# Patient Record
Sex: FEMALE | Race: BLACK OR AFRICAN AMERICAN | NOT HISPANIC OR LATINO | URBAN - METROPOLITAN AREA
[De-identification: names, ages, dates, MRNs, and addresses within clinical notes are randomized per-mention and may not be internally consistent; named-entity substitution may affect disease eponyms.]

---

## 2017-03-08 VITALS
SYSTOLIC BLOOD PRESSURE: 185 MMHG | HEART RATE: 59 BPM | TEMPERATURE: 98 F | OXYGEN SATURATION: 99 % | WEIGHT: 293 LBS | DIASTOLIC BLOOD PRESSURE: 69 MMHG | HEIGHT: 61 IN | RESPIRATION RATE: 16 BRPM

## 2017-03-08 NOTE — H&P ADULT - ASSESSMENT
76 y.o Female with PMHx of HTN, hyperlipidemia, DM II,  Known LBBB, Prior hx of Vaginal Bleed ~ one year ago while on DAPT subsequently  taken off of Plavix and continued on ASA, Known CAD with hx of multiple prior PCIs, most recently @ [place] in 03/2007 (3/14/2007 PCI to distal LAD per stent card),  who presented to her cardiologist c/o CP and exertional SOB over the past 6 months. Pt reports getting SOB upon ambulation of less than one city block or when climbing less than one flight of stairs, relieved with rest.   She also reports non radiating sub sternal chest "tightness" occurring mainly while at rest. Nuclear Stress test done on 01/30/17 revealed small reversible perfusion abnormality involving the mid inferior wall, LVEF of 54%.    Of note, pt reports fungal rash on her L side for the past week. Denies fever, chills.   In light of pt’s risk factors, Known CAD, and above presenting CCS Anginal Class 4 Symptoms, and abnormal stress test, pt is now referred to [facility] for recommended Cardiac Cath with possible intervention if clinically indicated to r/o suspected progression of CAD.    LBBB @ 56 BPM    BP in holding area 160's/110's. Dr. Aguilar is aware and will give medication in the room if necessary.     As discussed with Dr. Aguilar, ASA 325mg PO given prior to cardiac cath. No Plavix prior to cath.     ASA 3, Mallampati 3 76 y.o Female with PMHx of HTN, hyperlipidemia, DM II,  Known LBBB, Prior hx of Vaginal Bleed ~ one year ago while on DAPT subsequently  taken off of Plavix and continued on ASA, Known CAD with hx of multiple prior PCIs, most recently @ [place] in 03/2007 (3/14/2007 PCI to distal LAD per stent card),  who presented to her cardiologist c/o CP and exertional SOB over the past 6 months. Pt reports getting SOB upon ambulation of less than one city block or when climbing less than one flight of stairs, relieved with rest.   She also reports non radiating sub sternal chest "tightness" occurring mainly while at rest. Nuclear Stress test done on 01/30/17 revealed small reversible perfusion abnormality involving the mid inferior wall, LVEF of 54%.    Of note, pt reports fungal rash on her L side for the past week. Denies fever, chills.   In light of pt’s risk factors, Known CAD, and above presenting CCS Anginal Class 4 Symptoms, and abnormal stress test, pt is now referred to [facility] for recommended Cardiac Cath with possible intervention if clinically indicated to r/o suspected progression of CAD.    LBBB @ 56 BPM    BP in holding area 160-180s/90s-110's. Dr. Aguilar is aware and will give medication in the room if necessary.     As discussed with Dr. Aguilar, ASA 325mg PO given prior to cardiac cath. No Plavix prior to cath.     ASA 3, Mallampati 3 76 y.o Female with PMHx of HTN, hyperlipidemia, DM II,  Known LBBB, Prior hx of Vaginal Bleed ~ one year ago while on DAPT subsequently  taken off of Plavix and continued on ASA, Known CAD with hx of multiple prior PCIs, most recently @ [place] in 03/2007 (3/14/2007 PCI to distal LAD per stent card),  who presented to her cardiologist c/o CP and exertional SOB over the past 6 months. Pt reports getting SOB upon ambulation of less than one city block or when climbing less than one flight of stairs, relieved with rest.   She also reports non radiating sub sternal chest "tightness" occurring mainly while at rest. Nuclear Stress test done on 01/30/17 revealed small reversible perfusion abnormality involving the mid inferior wall, LVEF of 54%.    Of note, pt reports fungal rash on her L side for the past week. Denies fever, chills.   In light of pt’s risk factors, Known CAD, and above presenting CCS Anginal Class 4 Symptoms, and abnormal stress test, pt is now referred to [facility] for recommended Cardiac Cath with possible intervention if clinically indicated to r/o suspected progression of CAD.    LBBB @ 56 BPM    BP in holding area 160-180s/90s-110's. Dr. Aguilar is aware and will give medication in the room if necessary.     As discussed with Dr. Aguilar, ASA 325mg PO and Plavix 600mg PO given prior to cardiac cath. H/H stable.    Potassium 6.3 prior to cath. Repeat iStat was 3.5. Elevated level most likely lab error. As per Dr. Aguilar, okay to proceed with cath.     ASA 3, Mallampati 3 76 y.o Female with PMHx of HTN, hyperlipidemia, DM II,  Known LBBB, Prior hx of Vaginal Bleed ~ one year ago while on DAPT subsequently  taken off of Plavix and continued on ASA, Known CAD with hx of multiple prior PCIs, most recently @ [place] in 03/2007 (3/14/2007 PCI to distal LAD per stent card),  who presented to her cardiologist c/o CP and exertional SOB over the past 6 months. Pt reports getting SOB upon ambulation of less than one city block or when climbing less than one flight of stairs, relieved with rest.   She also reports non radiating sub sternal chest "tightness" occurring mainly while at rest. Nuclear Stress test done on 01/30/17 revealed small reversible perfusion abnormality involving the mid inferior wall, LVEF of 54%.    Of note, pt reports fungal rash on her L side for the past week. Denies fever, chills.   In light of pt’s risk factors, Known CAD, and above presenting CCS Anginal Class 4 Symptoms, and abnormal stress test, pt is now referred to [facility] for recommended Cardiac Cath with possible intervention if clinically indicated to r/o suspected progression of CAD.    LBBB @ 56 BPM    BP in holding area 160-180s/90s-110's. Dr. Aguilar is aware and will give medication in the room if necessary.     As discussed with Dr. Aguilar, ASA 325mg PO and Plavix 600mg PO given prior to cardiac cath. H/H stable.    Potassium 6.3 prior to cath (hemolyzed specimen). Repeat iStat was 3.5. Elevated level most likely lab error. As per Dr. Aguilar, okay to proceed with cath.     ASA 3, Mallampati 3

## 2017-03-08 NOTE — H&P ADULT - NSHPREVIEWOFSYSTEMS_GEN_ALL_CORE
GENERAL, CONSTITUTIONAL : denies recent weight loss, fever, chills  EYES, VISION: denies changes in vision   EARS, NOSE, THROAT: denies hearing loss  HEART, CARDIOVASCULAR: denies chest pain, arrhythmia, palpitations, SOB,  LE edema, claudication  RESPIRATORY: Denies cough, SOB, wheezing, PND, orthopnea  GASTROINTESTINAL: Denies abdominal pain, heartburn, bloody stool, dark tarry stool  GENITOURINARY: Denies frequent urination, urgency  MUSCULOSKELETAL denies joint pain or swelling, restricted motion, musculoskeletal pain.   SKIN & INTEGUMENTARY Denies rashes, sores, blisters, blisters, growths.  NEUROLOGICAL: Denies numbness or tingling sensations, sensation loss, burning.   PSYCHIATRIC: Denies nervousness, anxiety, depression  ENDOCRINE Denies heat or cold intolerance, excessive thirst  HEMATOLOGIC/LYMPHATIC: Denies abnormal bleeding, bleeding of any kind GENERAL, CONSTITUTIONAL : denies recent weight loss, fever, chills  EYES, VISION: denies changes in vision   EARS, NOSE, THROAT: denies hearing loss  HEART, CARDIOVASCULAR: denies chest pain, arrhythmia, palpitations, LE edema, claudication, positive for SOB on exertion and at rest  RESPIRATORY: Denies cough, wheezing, PND, orthopnea, positive for SOB at rest and on exertion.  GASTROINTESTINAL: Denies abdominal pain, heartburn, bloody stool, dark tarry stool  GENITOURINARY: Denies frequent urination, urgency, positive for incontinence  MUSCULOSKELETAL positive for joint pain, denies swelling, restricted motion, musculoskeletal pain.   SKIN & INTEGUMENTARY Denies rashes, sores, blisters, blisters, growths.  NEUROLOGICAL: Denies numbness or tingling sensations, sensation loss, burning.   PSYCHIATRIC: Denies nervousness, anxiety, depression  ENDOCRINE Denies heat or cold intolerance, excessive thirst  HEMATOLOGIC/LYMPHATIC: Denies abnormal bleeding, bleeding of any kind GENERAL, CONSTITUTIONAL: denies recent weight loss, fever, chills  EYES, VISION: denies changes in vision   EARS, NOSE, THROAT: denies hearing loss  HEART, CARDIOVASCULAR:  + CP,  +SOB, denies arrhythmia, palpitations, LE edema, claudication  RESPIRATORY: + SOB, Denies cough, wheezing, PND, orthopnea,  GASTROINTESTINAL: Denies abdominal pain, heartburn, bloody stool, dark tarry stool  GENITOURINARY: Denies frequent urination, urgency, positive for incontinence  MUSCULOSKELETAL + Joint pain, denies swelling, restricted motion, musculoskeletal pain.   SKIN & INTEGUMENTARY Denies rashes, sores, blisters, blisters, growths.  NEUROLOGICAL: Denies numbness or tingling sensations, sensation loss, burning.   PSYCHIATRIC: Denies nervousness, anxiety, depression  ENDOCRINE Denies heat or cold intolerance, excessive thirst  HEMATOLOGIC/LYMPHATIC: Denies abnormal bleeding, bleeding of any kind

## 2017-03-08 NOTE — H&P ADULT - PMH
CAD (coronary artery disease)    Diabetes insipidus    Gout    Hyperlipidemia    Hypertension    Osteoarthritis    Seasonal allergies CAD (coronary artery disease)    Diabetes mellitus    Gout    Hyperlipidemia    Hypertension    Osteoarthritis    Seasonal allergies

## 2017-03-08 NOTE — H&P ADULT - HISTORY OF PRESENT ILLNESS
76 y.o. female with no know allergies, PMHx of CAD, hyperlipidemia, Diabetes (II), Hypertension, s/p multiple PCI's most recent @ [place] about 5 years ago as per patient who presented to cardiologist c/o SOB and exertional dyspnea. Patient stated that she can walk one block with difficulty and less than a flight of stairs. Occasionally, patient has chest tightness on rest as well. Pt underwent a NST (01/30/2017) which revealed abnormal myocardial perfusion consistent of a small, reversible perfusion abnormality of mild intensity involving the mid inferior wall encompassing approximately less than 10% of the LV myocardium suggesting ischemia. Gated images demonstrated a global LVEF of 54% with normal segmental motion and thickening. Pt. denies accompanying palpitations, diaphoresis, n/v, dizziness, syncope, melena, hematochezia, fever/chills. Pt is out of Plavix due to vaginal bleeding incident one year ago. 76 y.o. female with no know allergies, PMHx of CAD, hyperlipidemia, Diabetes (II), Hypertension, s/p multiple PCI's most recent @ [place] about 5 years ago as per patient, who presented to cardiologist c/o SOB and exertional dyspnea. Patient stated that she can walk one block with difficulty and less than a flight of stairs. Occasionally, patient has chest tightness on rest as well. Pt underwent a NST (01/30/2017) which revealed abnormal myocardial perfusion consistent of a small, reversible perfusion abnormality of mild intensity involving the mid inferior wall encompassing approximately less than 10% of the LV myocardium suggesting ischemia. Gated images demonstrated a global LVEF of 54% with normal segmental motion and thickening. Pt. denies accompanying palpitations, diaphoresis, n/v, dizziness, syncope, melena, hematochezia, fever/chills. Pt is out of Plavix due to vaginal bleeding incident one year ago. 76 y.o Female with PMHx of HTN, hyperlipidemia, DM II,  Known LBBB, Prior hx of Vaginal Bleed ~ one year ago while on DAPT subsequently  taken off of Plavix and continued on ASA, Known CAD with hx of multiple prior PCIs, most recently @ [place] ~ 5yrs ago (NEED TO OBTAIN REPORT), who presented to her cardiologist c/o CP and exertional SOB over the past 6 months. Pt reports getting SOB upon ambulation of less than one city block or when climbing less than one flight of stairs, relieved with rest.   She also reports non radiating sub sternal chest "tightness" occurring mainly while at rest. Nuclear Stress test done on 01/30/17 revealed small reversible perfusion abnormality involving the mid inferior wall, LVEF of 54%.    In light of pt’s risk factors, Known CAD, and above presenting CCS Anginal Class 4 Symptoms, and abnormal stress test, pt is now referred to [facility] for recommended Cardiac Cath with possible intervention if clinically indicated to r/o suspected progression of CAD. 76 y.o Female with PMHx of HTN, hyperlipidemia, DM II,  Known LBBB, Prior hx of Vaginal Bleed ~ one year ago while on DAPT subsequently  taken off of Plavix and continued on ASA, Known CAD with hx of multiple prior PCIs, most recently @ [facility] in 03/2007 (Report Archived),  who presented to her cardiologist c/o CP and exertional SOB over the past 6 months. Pt reports getting SOB upon ambulation of less than one city block or when climbing less than one flight of stairs, relieved with rest.   She also reports non radiating sub sternal chest "tightness" occurring mainly while at rest. Nuclear Stress test done on 01/30/17 revealed small reversible perfusion abnormality involving the mid inferior wall, LVEF of 54%.    In light of pt’s risk factors, Known CAD, and above presenting CCS Anginal Class 4 Symptoms, and abnormal stress test, pt is now referred to [facility] for recommended Cardiac Cath with possible intervention if clinically indicated to r/o suspected progression of CAD. 76 y.o Female with PMHx of HTN, hyperlipidemia, DM II,  Known LBBB, Prior hx of Vaginal Bleed ~ one year ago while on DAPT subsequently  taken off of Plavix and continued on ASA, Known CAD with hx of multiple prior PCIs, most recently @ [place] in 03/2007 (3/14/2007 PCI to distal LAD per stent card),  who presented to her cardiologist c/o CP and exertional SOB over the past 6 months. Pt reports getting SOB upon ambulation of less than one city block or when climbing less than one flight of stairs, relieved with rest.   She also reports non radiating sub sternal chest "tightness" occurring mainly while at rest. Nuclear Stress test done on 01/30/17 revealed small reversible perfusion abnormality involving the mid inferior wall, LVEF of 54%.  Denies PND, orthopnea, n/v, dizziness.  Of note, pt reports fungal rash on her L side for the past week. Denies fever, chills.   In light of pt’s risk factors, Known CAD, and above presenting CCS Anginal Class 4 Symptoms, and abnormal stress test, pt is now referred to [facility] for recommended Cardiac Cath with possible intervention if clinically indicated to r/o suspected progression of CAD.

## 2017-03-10 ENCOUNTER — INPATIENT (INPATIENT)
Facility: HOSPITAL | Age: 77
LOS: 0 days | Discharge: HOME | DRG: 247 | End: 2017-03-11
Attending: INTERNAL MEDICINE | Admitting: INTERNAL MEDICINE
Payer: MEDICARE

## 2017-03-10 LAB
ALBUMIN SERPL ELPH-MCNC: 3.3 G/DL — LOW (ref 3.4–5)
ALP SERPL-CCNC: 89 U/L — SIGNIFICANT CHANGE UP (ref 40–120)
ALT FLD-CCNC: 15 U/L — SIGNIFICANT CHANGE UP (ref 12–42)
ANION GAP SERPL CALC-SCNC: 7 MMOL/L — LOW (ref 9–16)
APTT BLD: 33.5 SEC — SIGNIFICANT CHANGE UP (ref 27.5–37.4)
AST SERPL-CCNC: 40 U/L — HIGH (ref 15–37)
BASOPHILS NFR BLD AUTO: 0.7 % — SIGNIFICANT CHANGE UP (ref 0–2)
BILIRUB SERPL-MCNC: 0.8 MG/DL — SIGNIFICANT CHANGE UP (ref 0.2–1.2)
BUN SERPL-MCNC: 13 MG/DL — SIGNIFICANT CHANGE UP (ref 7–23)
CALCIUM SERPL-MCNC: 9.2 MG/DL — SIGNIFICANT CHANGE UP (ref 8.5–10.5)
CHLORIDE SERPL-SCNC: 104 MMOL/L — SIGNIFICANT CHANGE UP (ref 96–108)
CHOLEST SERPL-MCNC: 196 MG/DL — SIGNIFICANT CHANGE UP
CK MB CFR SERPL CALC: 1.7 NG/ML — SIGNIFICANT CHANGE UP (ref 0.5–3.6)
CO2 SERPL-SCNC: 24 MMOL/L — SIGNIFICANT CHANGE UP (ref 22–31)
CREAT SERPL-MCNC: 0.62 MG/DL — SIGNIFICANT CHANGE UP (ref 0.5–1.3)
EOSINOPHIL NFR BLD AUTO: 2.7 % — SIGNIFICANT CHANGE UP (ref 0–6)
GLUCOSE SERPL-MCNC: 87 MG/DL — SIGNIFICANT CHANGE UP (ref 70–99)
HBA1C BLD-MCNC: 6.9 % — HIGH (ref 4.8–5.6)
HCT VFR BLD CALC: 37.9 % — SIGNIFICANT CHANGE UP (ref 34.5–45)
HDLC SERPL-MCNC: 52 MG/DL — SIGNIFICANT CHANGE UP
HGB BLD-MCNC: 12.2 G/DL — SIGNIFICANT CHANGE UP (ref 11.5–15.5)
INR BLD: 1.2 — HIGH (ref 0.88–1.16)
LIPID PNL WITH DIRECT LDL SERPL: 122 MG/DL — HIGH
LYMPHOCYTES # BLD AUTO: 39.5 % — SIGNIFICANT CHANGE UP (ref 13–44)
MCHC RBC-ENTMCNC: 27.1 PG — SIGNIFICANT CHANGE UP (ref 27–34)
MCHC RBC-ENTMCNC: 32.2 G/DL — SIGNIFICANT CHANGE UP (ref 32–36)
MCV RBC AUTO: 84.2 FL — SIGNIFICANT CHANGE UP (ref 80–100)
MONOCYTES NFR BLD AUTO: 7.7 % — SIGNIFICANT CHANGE UP (ref 2–14)
NEUTROPHILS NFR BLD AUTO: 49.4 % — SIGNIFICANT CHANGE UP (ref 43–77)
PLATELET # BLD AUTO: 312 K/UL — SIGNIFICANT CHANGE UP (ref 150–400)
POTASSIUM SERPL-MCNC: 6.5 MMOL/L — CRITICAL HIGH (ref 3.5–5.3)
POTASSIUM SERPL-SCNC: 6.5 MMOL/L — CRITICAL HIGH (ref 3.5–5.3)
PROT SERPL-MCNC: 9.2 G/DL — HIGH (ref 6.4–8.2)
PROTHROM AB SERPL-ACNC: 13.4 SEC — HIGH (ref 10–13.1)
RBC # BLD: 4.5 M/UL — SIGNIFICANT CHANGE UP (ref 3.8–5.2)
RBC # FLD: 14.9 % — SIGNIFICANT CHANGE UP (ref 10.3–16.9)
SODIUM SERPL-SCNC: 135 MMOL/L — SIGNIFICANT CHANGE UP (ref 135–145)
TOTAL CHOLESTEROL/HDL RATIO MEASUREMENT: 3.8 RATIO — SIGNIFICANT CHANGE UP
TRIGL SERPL-MCNC: 111 MG/DL — SIGNIFICANT CHANGE UP
WBC # BLD: 8.1 K/UL — SIGNIFICANT CHANGE UP (ref 3.8–10.5)
WBC # FLD AUTO: 8.1 K/UL — SIGNIFICANT CHANGE UP (ref 3.8–10.5)

## 2017-03-10 PROCEDURE — 93458 L HRT ARTERY/VENTRICLE ANGIO: CPT | Mod: 26,XU

## 2017-03-10 PROCEDURE — 93010 ELECTROCARDIOGRAM REPORT: CPT

## 2017-03-10 PROCEDURE — 92933 PRQ TRLML C ATHRC ST ANGIOP1: CPT | Mod: LD

## 2017-03-10 RX ORDER — ASPIRIN/CALCIUM CARB/MAGNESIUM 324 MG
325 TABLET ORAL ONCE
Qty: 0 | Refills: 0 | Status: DISCONTINUED | OUTPATIENT
Start: 2017-03-10 | End: 2017-03-10

## 2017-03-10 RX ORDER — GLUCAGON INJECTION, SOLUTION 0.5 MG/.1ML
1 INJECTION, SOLUTION SUBCUTANEOUS ONCE
Qty: 0 | Refills: 0 | Status: DISCONTINUED | OUTPATIENT
Start: 2017-03-10 | End: 2017-03-11

## 2017-03-10 RX ORDER — SODIUM CHLORIDE 9 MG/ML
1000 INJECTION, SOLUTION INTRAVENOUS
Qty: 0 | Refills: 0 | Status: DISCONTINUED | OUTPATIENT
Start: 2017-03-10 | End: 2017-03-11

## 2017-03-10 RX ORDER — INSULIN LISPRO 100/ML
VIAL (ML) SUBCUTANEOUS
Qty: 0 | Refills: 0 | Status: COMPLETED | OUTPATIENT
Start: 2017-03-10 | End: 2017-03-10

## 2017-03-10 RX ORDER — ATENOLOL 25 MG/1
100 TABLET ORAL DAILY
Qty: 0 | Refills: 0 | Status: DISCONTINUED | OUTPATIENT
Start: 2017-03-10 | End: 2017-03-11

## 2017-03-10 RX ORDER — INSULIN LISPRO 100/ML
VIAL (ML) SUBCUTANEOUS ONCE
Qty: 0 | Refills: 0 | Status: COMPLETED | OUTPATIENT
Start: 2017-03-10 | End: 2017-03-10

## 2017-03-10 RX ORDER — DEXTROSE 50 % IN WATER 50 %
12.5 SYRINGE (ML) INTRAVENOUS ONCE
Qty: 0 | Refills: 0 | Status: DISCONTINUED | OUTPATIENT
Start: 2017-03-10 | End: 2017-03-11

## 2017-03-10 RX ORDER — ISOSORBIDE MONONITRATE 60 MG/1
60 TABLET, EXTENDED RELEASE ORAL DAILY
Qty: 0 | Refills: 0 | Status: DISCONTINUED | OUTPATIENT
Start: 2017-03-10 | End: 2017-03-11

## 2017-03-10 RX ORDER — AMLODIPINE BESYLATE 2.5 MG/1
10 TABLET ORAL DAILY
Qty: 0 | Refills: 0 | Status: DISCONTINUED | OUTPATIENT
Start: 2017-03-10 | End: 2017-03-11

## 2017-03-10 RX ORDER — DEXTROSE 50 % IN WATER 50 %
25 SYRINGE (ML) INTRAVENOUS ONCE
Qty: 0 | Refills: 0 | Status: DISCONTINUED | OUTPATIENT
Start: 2017-03-10 | End: 2017-03-11

## 2017-03-10 RX ORDER — ASPIRIN/CALCIUM CARB/MAGNESIUM 324 MG
325 TABLET ORAL ONCE
Qty: 0 | Refills: 0 | Status: COMPLETED | OUTPATIENT
Start: 2017-03-10 | End: 2017-03-10

## 2017-03-10 RX ORDER — DEXTROSE 50 % IN WATER 50 %
1 SYRINGE (ML) INTRAVENOUS ONCE
Qty: 0 | Refills: 0 | Status: DISCONTINUED | OUTPATIENT
Start: 2017-03-10 | End: 2017-03-11

## 2017-03-10 RX ORDER — ASPIRIN/CALCIUM CARB/MAGNESIUM 324 MG
81 TABLET ORAL DAILY
Qty: 0 | Refills: 0 | Status: DISCONTINUED | OUTPATIENT
Start: 2017-03-10 | End: 2017-03-11

## 2017-03-10 RX ORDER — CLOPIDOGREL BISULFATE 75 MG/1
75 TABLET, FILM COATED ORAL DAILY
Qty: 0 | Refills: 0 | Status: DISCONTINUED | OUTPATIENT
Start: 2017-03-10 | End: 2017-03-11

## 2017-03-10 RX ORDER — CHLORHEXIDINE GLUCONATE 213 G/1000ML
1 SOLUTION TOPICAL ONCE
Qty: 0 | Refills: 0 | Status: DISCONTINUED | OUTPATIENT
Start: 2017-03-10 | End: 2017-03-10

## 2017-03-10 RX ORDER — LEVOTHYROXINE SODIUM 125 MCG
50 TABLET ORAL DAILY
Qty: 0 | Refills: 0 | Status: DISCONTINUED | OUTPATIENT
Start: 2017-03-10 | End: 2017-03-11

## 2017-03-10 RX ORDER — ALLOPURINOL 300 MG
300 TABLET ORAL DAILY
Qty: 0 | Refills: 0 | Status: DISCONTINUED | OUTPATIENT
Start: 2017-03-10 | End: 2017-03-11

## 2017-03-10 RX ORDER — ATORVASTATIN CALCIUM 80 MG/1
40 TABLET, FILM COATED ORAL AT BEDTIME
Qty: 0 | Refills: 0 | Status: DISCONTINUED | OUTPATIENT
Start: 2017-03-10 | End: 2017-03-11

## 2017-03-10 RX ORDER — CLOPIDOGREL BISULFATE 75 MG/1
600 TABLET, FILM COATED ORAL ONCE
Qty: 0 | Refills: 0 | Status: COMPLETED | OUTPATIENT
Start: 2017-03-10 | End: 2017-03-10

## 2017-03-10 RX ADMIN — AMLODIPINE BESYLATE 10 MILLIGRAM(S): 2.5 TABLET ORAL at 22:32

## 2017-03-10 RX ADMIN — ISOSORBIDE MONONITRATE 60 MILLIGRAM(S): 60 TABLET, EXTENDED RELEASE ORAL at 22:32

## 2017-03-10 RX ADMIN — ATORVASTATIN CALCIUM 40 MILLIGRAM(S): 80 TABLET, FILM COATED ORAL at 22:32

## 2017-03-10 RX ADMIN — Medication 300 MILLIGRAM(S): at 22:32

## 2017-03-10 RX ADMIN — Medication 325 MILLIGRAM(S): at 19:56

## 2017-03-10 RX ADMIN — CLOPIDOGREL BISULFATE 600 MILLIGRAM(S): 75 TABLET, FILM COATED ORAL at 19:56

## 2017-03-11 ENCOUNTER — TRANSCRIPTION ENCOUNTER (OUTPATIENT)
Age: 77
End: 2017-03-11

## 2017-03-11 VITALS — TEMPERATURE: 97 F

## 2017-03-11 LAB
ANION GAP SERPL CALC-SCNC: 10 MMOL/L — SIGNIFICANT CHANGE UP (ref 9–16)
BASOPHILS NFR BLD AUTO: 0.8 % — SIGNIFICANT CHANGE UP (ref 0–2)
BUN SERPL-MCNC: 18 MG/DL — SIGNIFICANT CHANGE UP (ref 7–23)
CALCIUM SERPL-MCNC: 9.3 MG/DL — SIGNIFICANT CHANGE UP (ref 8.5–10.5)
CHLORIDE SERPL-SCNC: 103 MMOL/L — SIGNIFICANT CHANGE UP (ref 96–108)
CO2 SERPL-SCNC: 26 MMOL/L — SIGNIFICANT CHANGE UP (ref 22–31)
CREAT SERPL-MCNC: 0.66 MG/DL — SIGNIFICANT CHANGE UP (ref 0.5–1.3)
EOSINOPHIL NFR BLD AUTO: 1.3 % — SIGNIFICANT CHANGE UP (ref 0–6)
GLUCOSE SERPL-MCNC: 108 MG/DL — HIGH (ref 70–99)
HCT VFR BLD CALC: 33.4 % — LOW (ref 34.5–45)
HGB BLD-MCNC: 11 G/DL — LOW (ref 11.5–15.5)
LYMPHOCYTES # BLD AUTO: 36.4 % — SIGNIFICANT CHANGE UP (ref 13–44)
MAGNESIUM SERPL-MCNC: 1.8 MG/DL — SIGNIFICANT CHANGE UP (ref 1.6–2.4)
MCHC RBC-ENTMCNC: 27.4 PG — SIGNIFICANT CHANGE UP (ref 27–34)
MCHC RBC-ENTMCNC: 32.9 G/DL — SIGNIFICANT CHANGE UP (ref 32–36)
MCV RBC AUTO: 83.1 FL — SIGNIFICANT CHANGE UP (ref 80–100)
MONOCYTES NFR BLD AUTO: 8.5 % — SIGNIFICANT CHANGE UP (ref 2–14)
NEUTROPHILS NFR BLD AUTO: 53 % — SIGNIFICANT CHANGE UP (ref 43–77)
PLATELET # BLD AUTO: 294 K/UL — SIGNIFICANT CHANGE UP (ref 150–400)
POTASSIUM SERPL-MCNC: 3.7 MMOL/L — SIGNIFICANT CHANGE UP (ref 3.5–5.3)
POTASSIUM SERPL-SCNC: 3.7 MMOL/L — SIGNIFICANT CHANGE UP (ref 3.5–5.3)
RBC # BLD: 4.02 M/UL — SIGNIFICANT CHANGE UP (ref 3.8–5.2)
RBC # FLD: 14.6 % — SIGNIFICANT CHANGE UP (ref 10.3–16.9)
SODIUM SERPL-SCNC: 139 MMOL/L — SIGNIFICANT CHANGE UP (ref 135–145)
WBC # BLD: 7.2 K/UL — SIGNIFICANT CHANGE UP (ref 3.8–10.5)
WBC # FLD AUTO: 7.2 K/UL — SIGNIFICANT CHANGE UP (ref 3.8–10.5)

## 2017-03-11 RX ORDER — CLOPIDOGREL BISULFATE 75 MG/1
1 TABLET, FILM COATED ORAL
Qty: 30 | Refills: 3 | OUTPATIENT
Start: 2017-03-11 | End: 2017-07-08

## 2017-03-11 RX ORDER — METFORMIN HYDROCHLORIDE 850 MG/1
1 TABLET ORAL
Qty: 0 | Refills: 0 | COMMUNITY

## 2017-03-11 RX ORDER — ATORVASTATIN CALCIUM 80 MG/1
1 TABLET, FILM COATED ORAL
Qty: 30 | Refills: 3 | OUTPATIENT
Start: 2017-03-11 | End: 2017-07-08

## 2017-03-11 RX ORDER — MAGNESIUM OXIDE 400 MG ORAL TABLET 241.3 MG
800 TABLET ORAL ONCE
Qty: 0 | Refills: 0 | Status: COMPLETED | OUTPATIENT
Start: 2017-03-11 | End: 2017-03-11

## 2017-03-11 RX ORDER — ONDANSETRON 8 MG/1
4 TABLET, FILM COATED ORAL ONCE
Qty: 0 | Refills: 0 | Status: COMPLETED | OUTPATIENT
Start: 2017-03-11 | End: 2017-03-11

## 2017-03-11 RX ORDER — POTASSIUM CHLORIDE 20 MEQ
40 PACKET (EA) ORAL ONCE
Qty: 0 | Refills: 0 | Status: COMPLETED | OUTPATIENT
Start: 2017-03-11 | End: 2017-03-11

## 2017-03-11 RX ORDER — INSULIN LISPRO 100/ML
VIAL (ML) SUBCUTANEOUS
Qty: 0 | Refills: 0 | Status: DISCONTINUED | OUTPATIENT
Start: 2017-03-11 | End: 2017-03-11

## 2017-03-11 RX ORDER — CLOPIDOGREL BISULFATE 75 MG/1
1 TABLET, FILM COATED ORAL
Qty: 30 | Refills: 11 | OUTPATIENT
Start: 2017-03-11 | End: 2018-03-05

## 2017-03-11 RX ORDER — ACETAMINOPHEN 500 MG
650 TABLET ORAL ONCE
Qty: 0 | Refills: 0 | Status: COMPLETED | OUTPATIENT
Start: 2017-03-11 | End: 2017-03-11

## 2017-03-11 RX ORDER — FUROSEMIDE 40 MG
1 TABLET ORAL
Qty: 30 | Refills: 3 | OUTPATIENT
Start: 2017-03-11 | End: 2017-07-08

## 2017-03-11 RX ADMIN — ONDANSETRON 4 MILLIGRAM(S): 8 TABLET, FILM COATED ORAL at 05:34

## 2017-03-11 RX ADMIN — Medication 81 MILLIGRAM(S): at 11:01

## 2017-03-11 RX ADMIN — Medication 650 MILLIGRAM(S): at 14:33

## 2017-03-11 RX ADMIN — ISOSORBIDE MONONITRATE 60 MILLIGRAM(S): 60 TABLET, EXTENDED RELEASE ORAL at 11:01

## 2017-03-11 RX ADMIN — ATENOLOL 100 MILLIGRAM(S): 25 TABLET ORAL at 05:26

## 2017-03-11 RX ADMIN — Medication 50 MICROGRAM(S): at 05:26

## 2017-03-11 RX ADMIN — CLOPIDOGREL BISULFATE 75 MILLIGRAM(S): 75 TABLET, FILM COATED ORAL at 11:01

## 2017-03-11 RX ADMIN — Medication 40 MILLIEQUIVALENT(S): at 07:49

## 2017-03-11 RX ADMIN — Medication 300 MILLIGRAM(S): at 11:01

## 2017-03-11 RX ADMIN — MAGNESIUM OXIDE 400 MG ORAL TABLET 800 MILLIGRAM(S): 241.3 TABLET ORAL at 07:49

## 2017-03-11 NOTE — DISCHARGE NOTE ADULT - PLAN OF CARE
continue medications, follow up You underwent a cardiac angiogram and received a stent to the left anterior descending artery. PLEASE CONTINUE ASPIRIN 81MG DAILY AND PLAVIX 75MG DAILY. DO NOT STOP THESE MEDICATIONS FOR ANY REASON AS THEY ARE KEEPING YOUR STENT OPEN AND PREVENTING A HEART ATTACK. Avoid strenuous activity or heavy lifting with the right hand for the next five days. For any bleeding or hematoma formation (hardened blood collection under the skin) at the access site of right wrist please hold pressure and go to the emergency room. Please follow up with Dr. Aguilar in 1-2 weeks. For any recurrent chest pain, please call your doctor or return to the emergency room. continue medications RESTART METFORMIN ON MONDAY 3/13. Maintain a low carbohydrate diet. Check your blood sugar regularly. For blood sugar that is too high or too low please call your doctor or go to the emergency room as necessary. Please continue lipitor 80mg daily to keep your cholesterol low. High cholesterol contributes to heart disease. Please continue amlodipine 10mg daily, imdur 60mg daily and atenolol 100mg daily to keep your blood pressure controlled. Start lasix 40mg daily to help with blood pressure and prevent fluid retention. For blood pressure that is too high or too low please see your doctor or go to the emergency room as necessary. Continue levothyroxine 50mcgs daily.

## 2017-03-11 NOTE — DISCHARGE NOTE ADULT - HOSPITAL COURSE
76 y.o Female with PMHx of HTN, hyperlipidemia, DM II,  Known LBBB, Prior hx of Vaginal Bleed ~ one year ago while on DAPT subsequently  taken off of Plavix and continued on ASA, Known CAD with hx of multiple prior PCIs, most recently @ [place] in 03/2007 (3/14/2007 PCI to distal LAD per stent card),  who presented to her cardiologist c/o CP and exertional SOB over the past 6 months. Pt reports getting SOB upon ambulation of less than one city block or when climbing less than one flight of stairs, relieved with rest.   She also reports non radiating sub sternal chest "tightness" occurring mainly while at rest. Nuclear Stress test done on 01/30/17 revealed small reversible perfusion abnormality involving the mid inferior wall, LVEF of 54%.  Denies PND, orthopnea, n/v, dizziness. Patient underwent cardia cath on 3/10/17 and received CSI atherectomy and DAMIAN to distalLAD 85% ISR, LM normal, proxLAD 50%, proxLCx 50%, RCA luminal irregularities, EF 50%, EDP 31. Patient had elevated SBP and was given lasix 40mg IV as well as hydralazine 20mg IV x 1 in the room. 76 y.o Female with PMHx of HTN, hyperlipidemia, DM II,  Known LBBB, Prior hx of Vaginal Bleed ~ one year ago while on DAPT subsequently  taken off of Plavix and continued on ASA, Known CAD with hx of multiple prior PCIs, most recently @ [place] in 03/2007 (3/14/2007 PCI to distal LAD per stent card),  who presented to her cardiologist c/o CP and exertional SOB over the past 6 months. Pt reports getting SOB upon ambulation of less than one city block or when climbing less than one flight of stairs, relieved with rest.   She also reports non radiating sub sternal chest "tightness" occurring mainly while at rest. Nuclear Stress test done on 01/30/17 revealed small reversible perfusion abnormality involving the mid inferior wall, LVEF of 54%.  Denies PND, orthopnea, n/v, dizziness. Patient underwent cardia cath on 3/10/17 and received CSI atherectomy and DAMIAN to distalLAD 85% ISR, LM normal, proxLAD 50%, proxLCx 50%, RCA luminal irregularities, EF 50%, EDP 31. Patient had elevated SBP and was given lasix 40mg IV as well as hydralazine 20mg IV x 1 in the room. Patient was seen and examined this AM and is asymptomatic without complaints. Patient's potassium was 3.7, repleted with 40mEq potassium chloride. Patient's magnesium was 1.8, repleted with Mag Ox 800mg x1. The rest of patient's labs wnl. Patient's VSS and no events noted overnight on telemetry. Patient was stable for discharge per Dr. Hernadez. Patient has been given appropriate discharge instructions including access site management, follow up and medication regimen. All prescriptions sent to patient's preferred pharmacy.     Temp 97.3F, HR 59bpm, /62, RR 20, O2 sat 95% RA  Gen: NAD, A&O x 3  Cards: RRR, clear S1 and S2 without murmur  Pulm: CTA b/l without w/r/r  Abd: BS x 4, soft, NT  Right wrist: no bleed or hematoma, stable, right radial pulse 2+  Ext: No LE edema or ulcerations b/l

## 2017-03-11 NOTE — DISCHARGE NOTE ADULT - MEDICATION SUMMARY - MEDICATIONS TO TAKE
I will START or STAY ON the medications listed below when I get home from the hospital:    aspirin 81 mg oral tablet  -- 1 tab(s) by mouth once a day  -- Indication: For Coronary artery disease, keeps stent open    isosorbide mononitrate 60 mg oral tablet, extended release  -- 1 tab(s) by mouth once a day (in the morning)  -- Indication: For High blood pressure    metFORMIN 500 mg oral tablet  -- 1 tab(s) by mouth once a day RESTART MONDAY 3/16  -- Indication: For Diabetes mellitus    allopurinol 300 mg oral tablet  -- 1 tab(s) by mouth once a day  -- Indication: For prevents gout flare    Claritin 10 mg oral tablet  -- 1 tab(s) by mouth once a day, As Needed  -- Indication: For allergies    atorvastatin 80 mg oral tablet  -- 1 tab(s) by mouth once a day  -- Indication: For High cholesterol    amLODIPine-benazepril 10 mg-20 mg oral capsule  -- 1 cap(s) by mouth once a day  -- Indication: For High blood pressure    clopidogrel 75 mg oral tablet  -- 1 tab(s) by mouth once a day  -- Indication: For Coronary artery disease, keeps stent open    atenolol 100 mg oral tablet  -- 1 tab(s) by mouth once a day  -- Indication: For High blood pressure, protects heart    Lasix 40 mg oral tablet  -- 1 tab(s) by mouth once a day  -- Avoid prolonged or excessive exposure to direct and/or artificial sunlight while taking this medication.  It is very important that you take or use this exactly as directed.  Do not skip doses or discontinue unless directed by your doctor.  It may be advisable to drink a full glass orange juice or eat a banana daily while taking this medication.    -- Indication: For High blood pressure, prevents fluid retention    levothyroxine 50 mcg (0.05 mg) oral capsule  -- 1 cap(s) by mouth once a day  -- Indication: For Hypothyroidism I will START or STAY ON the medications listed below when I get home from the hospital:    aspirin 81 mg oral tablet  -- 1 tab(s) by mouth once a day  -- Indication: For Coronary artery disease, keeps stent open    isosorbide mononitrate 60 mg oral tablet, extended release  -- 1 tab(s) by mouth once a day (in the morning)  -- Indication: For High blood pressure    metFORMIN 500 mg oral tablet  -- 1 tab(s) by mouth once a day RESTART MONDAY 3/16  -- Indication: For Diabetes mellitus    allopurinol 300 mg oral tablet  -- 1 tab(s) by mouth once a day  -- Indication: For prevents gout flare    Claritin 10 mg oral tablet  -- 1 tab(s) by mouth once a day, As Needed  -- Indication: For allergies    amLODIPine-benazepril 10 mg-20 mg oral capsule  -- 1 cap(s) by mouth once a day  -- Indication: For High blood pressure    atenolol 100 mg oral tablet  -- 1 tab(s) by mouth once a day  -- Indication: For High blood pressure, protects heart    Lasix 40 mg oral tablet  -- 1 tab(s) by mouth once a day  -- Avoid prolonged or excessive exposure to direct and/or artificial sunlight while taking this medication.  It is very important that you take or use this exactly as directed.  Do not skip doses or discontinue unless directed by your doctor.  It may be advisable to drink a full glass orange juice or eat a banana daily while taking this medication.    -- Indication: For High blood pressure, prevents fluid retention    levothyroxine 50 mcg (0.05 mg) oral capsule  -- 1 cap(s) by mouth once a day  -- Indication: For Hypothyroidism

## 2017-03-11 NOTE — DISCHARGE NOTE ADULT - CARE PLAN
Principal Discharge DX:	CAD (coronary artery disease)  Goal:	continue medications, follow up  Instructions for follow-up, activity and diet:	You underwent a cardiac angiogram and received a stent to the left anterior descending artery. PLEASE CONTINUE ASPIRIN 81MG DAILY AND PLAVIX 75MG DAILY. DO NOT STOP THESE MEDICATIONS FOR ANY REASON AS THEY ARE KEEPING YOUR STENT OPEN AND PREVENTING A HEART ATTACK. Avoid strenuous activity or heavy lifting with the right hand for the next five days. For any bleeding or hematoma formation (hardened blood collection under the skin) at the access site of right wrist please hold pressure and go to the emergency room. Please follow up with Dr. Aguilar in 1-2 weeks. For any recurrent chest pain, please call your doctor or return to the emergency room.  Secondary Diagnosis:	Diabetes mellitus  Goal:	continue medications  Instructions for follow-up, activity and diet:	RESTART METFORMIN ON MONDAY 3/13. Maintain a low carbohydrate diet. Check your blood sugar regularly. For blood sugar that is too high or too low please call your doctor or go to the emergency room as necessary.  Secondary Diagnosis:	Hyperlipidemia  Goal:	continue medications  Instructions for follow-up, activity and diet:	Please continue lipitor 80mg daily to keep your cholesterol low. High cholesterol contributes to heart disease.  Secondary Diagnosis:	Hypertension  Goal:	continue medications  Instructions for follow-up, activity and diet:	Please continue amlodipine 10mg daily, imdur 60mg daily and atenolol 100mg daily to keep your blood pressure controlled. Start lasix 40mg daily to help with blood pressure and prevent fluid retention. For blood pressure that is too high or too low please see your doctor or go to the emergency room as necessary.  Secondary Diagnosis:	Hypothyroidism  Goal:	continue medications  Instructions for follow-up, activity and diet:	Continue levothyroxine 50mcgs daily.

## 2017-03-11 NOTE — DISCHARGE NOTE ADULT - PATIENT PORTAL LINK FT
“You can access the FollowHealth Patient Portal, offered by [facility], by registering with the following website: [email or web address]”

## 2017-03-11 NOTE — DISCHARGE NOTE ADULT - CARE PROVIDER_API CALL
Jayden Aguilar (REGGIE), Cardiovascular Disease; Interventional Cardiology; Nuclear Cardiology  [address] [place] [address]  Phone: [phone]  Fax: [phone]

## 2017-03-13 PROCEDURE — 82550 ASSAY OF CK (CPK): CPT

## 2017-03-13 PROCEDURE — 80053 COMPREHEN METABOLIC PANEL: CPT

## 2017-03-13 PROCEDURE — C1769: CPT

## 2017-03-13 PROCEDURE — 85025 COMPLETE CBC W/AUTO DIFF WBC: CPT

## 2017-03-13 PROCEDURE — 83036 HEMOGLOBIN GLYCOSYLATED A1C: CPT

## 2017-03-13 PROCEDURE — 82553 CREATINE MB FRACTION: CPT

## 2017-03-13 PROCEDURE — 80061 LIPID PANEL: CPT

## 2017-03-13 PROCEDURE — 85730 THROMBOPLASTIN TIME PARTIAL: CPT

## 2017-03-13 PROCEDURE — 93005 ELECTROCARDIOGRAM TRACING: CPT

## 2017-03-13 PROCEDURE — 80048 BASIC METABOLIC PNL TOTAL CA: CPT

## 2017-03-13 PROCEDURE — 83735 ASSAY OF MAGNESIUM: CPT

## 2017-03-13 PROCEDURE — 36415 COLL VENOUS BLD VENIPUNCTURE: CPT

## 2017-03-13 PROCEDURE — C1724: CPT

## 2017-03-13 PROCEDURE — C1874: CPT

## 2017-03-13 PROCEDURE — C1887: CPT

## 2017-03-13 PROCEDURE — 85610 PROTHROMBIN TIME: CPT

## 2017-03-16 DIAGNOSIS — E78.5 HYPERLIPIDEMIA, UNSPECIFIED: ICD-10-CM

## 2017-03-16 DIAGNOSIS — I25.10 ATHEROSCLEROTIC HEART DISEASE OF NATIVE CORONARY ARTERY WITHOUT ANGINA PECTORIS: ICD-10-CM
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
## 2017-03-16 DIAGNOSIS — T82.855A STENOSIS OF CORONARY ARTERY STENT, INITIAL ENCOUNTER: ICD-10-CM

## 2017-03-16 DIAGNOSIS — E11.9 TYPE 2 DIABETES MELLITUS WITHOUT COMPLICATIONS: ICD-10-CM

## 2017-03-16 DIAGNOSIS — I10 ESSENTIAL (PRIMARY) HYPERTENSION: ICD-10-CM

## 2017-03-16 DIAGNOSIS — H50.89 OTHER SPECIFIED STRABISMUS: ICD-10-CM

## 2017-03-16 DIAGNOSIS — E03.9 HYPOTHYROIDISM, UNSPECIFIED: ICD-10-CM

## 2017-03-16 DIAGNOSIS — Z79.82 LONG TERM (CURRENT) USE OF ASPIRIN: ICD-10-CM

## 2017-03-16 DIAGNOSIS — M19.90 UNSPECIFIED OSTEOARTHRITIS, UNSPECIFIED SITE: ICD-10-CM

## 2017-03-16 DIAGNOSIS — I25.118 ATHEROSCLEROTIC HEART DISEASE OF NATIVE CORONARY ARTERY WITH OTHER FORMS OF ANGINA PECTORIS: ICD-10-CM

## 2017-03-16 DIAGNOSIS — E66.9 OBESITY, UNSPECIFIED: ICD-10-CM

## 2017-03-16 DIAGNOSIS — I44.7 LEFT BUNDLE-BRANCH BLOCK, UNSPECIFIED: ICD-10-CM

## 2017-03-16 DIAGNOSIS — B36.8 OTHER SPECIFIED SUPERFICIAL MYCOSES: ICD-10-CM

## 2017-03-16 DIAGNOSIS — Z96.651 PRESENCE OF RIGHT ARTIFICIAL KNEE JOINT: ICD-10-CM

## 2017-03-16 DIAGNOSIS — M10.9 GOUT, UNSPECIFIED: ICD-10-CM

## 2017-03-16 DIAGNOSIS — Z79.84 LONG TERM (CURRENT) USE OF ORAL HYPOGLYCEMIC DRUGS: ICD-10-CM

## 2017-03-16 DIAGNOSIS — R94.39 ABNORMAL RESULT OF OTHER CARDIOVASCULAR FUNCTION STUDY: ICD-10-CM

## 2017-03-17 DIAGNOSIS — I25.110 ATHEROSCLEROTIC HEART DISEASE OF NATIVE CORONARY ARTERY WITH UNSTABLE ANGINA PECTORIS: ICD-10-CM

## 2018-07-11 NOTE — PATIENT PROFILE ADULT. - PRESSURE ULCER(S)
AUDIOLOGY REPORT    SUBJECTIVE: Deandra Garcia is a 22 month old female was seen in the [facility] on 2018 for a pediatric hearing evaluation, referred by Giacomo Ewing M.D., for concerns regarding bilateral eustachian tube dysfunction. Deandra was accompanied by her grandmother. Grandmother reports that Deandra has suffered 7 bilateral ear infections in the past 5 months.     Deandra  passed her  hearing screening bilaterally. There is not a known family history of childhood hearing loss or any other significant medical history. Deandra is currently in good health.    [facility] Risk Factors  Family history of childhood hearing loss- NONE  Concern regarding hearing, speech or language- No    OBJECTIVE:    Otoscopy revealed non-occluding cerumen.     Tympanograms showed restricted eardrum mobility bilaterally with normal ear canal volume (Type B).     Fair to good reliability was obtained to visual reinforcement audiometry in the soundfield.     Results were obtained from 500-4000 Hz and revealed mild loss at 500 Hz and normal hearing sensitivity at 1000 Hz and beyond.   NOTE: soundfield testing is not ear specific.     Speech recognition thresholds were in good agreement with puretone averages.    NOTE: patient receives a fair reliability on this as she was tired of tasking and would only occasionally respond.     ASSESSMENT: Today s results indicate bilateral eustachian tube dysfunction. Today s results were discussed with Deandra and her grandmother in detail.     PLAN: It is recommended that Deandra follow up with her ENT.  Please call this clinic at [phone] with questions regarding these results or recommendations.    Wally Huber CCC-A  Licensed Audiologist #[id number]  2018    CC: Dr. Ewing        
no

## 2019-03-29 ENCOUNTER — APPOINTMENT (OUTPATIENT)
Dept: GENERAL RADIOLOGY | Age: 79
End: 2019-03-29
Attending: EMERGENCY MEDICINE
Payer: MEDICARE

## 2019-03-29 ENCOUNTER — HOSPITAL ENCOUNTER (EMERGENCY)
Age: 79
Discharge: HOME | End: 2019-03-29
Attending: EMERGENCY MEDICINE
Payer: MEDICARE

## 2019-03-29 VITALS
TEMPERATURE: 97.6 F | BODY MASS INDEX: 55.32 KG/M2 | SYSTOLIC BLOOD PRESSURE: 171 MMHG | RESPIRATION RATE: 16 BRPM | HEART RATE: 79 BPM | OXYGEN SATURATION: 97 % | HEIGHT: 61 IN | DIASTOLIC BLOOD PRESSURE: 87 MMHG | WEIGHT: 293 LBS

## 2019-03-29 DIAGNOSIS — I25.10 CORONARY ARTERY DISEASE INVOLVING NATIVE CORONARY ARTERY OF NATIVE HEART, ANGINA PRESENCE UNSPECIFIED: ICD-10-CM

## 2019-03-29 DIAGNOSIS — R07.9 ACUTE CHEST PAIN: Primary | ICD-10-CM

## 2019-03-29 LAB
ALBUMIN SERPL-MCNC: 3.1 G/DL (ref 3.4–5)
ALBUMIN/GLOB SERPL: 0.6 {RATIO} (ref 0.8–1.7)
ALP SERPL-CCNC: 268 U/L (ref 45–117)
ALT SERPL-CCNC: 50 U/L (ref 13–56)
ANION GAP SERPL CALC-SCNC: 7 MMOL/L (ref 3–18)
AST SERPL-CCNC: 79 U/L (ref 15–37)
ATRIAL RATE: 104 BPM
BASOPHILS # BLD: 0 K/UL (ref 0–0.1)
BASOPHILS NFR BLD: 0 % (ref 0–2)
BILIRUB SERPL-MCNC: 0.5 MG/DL (ref 0.2–1)
BNP SERPL-MCNC: 1565 PG/ML (ref 0–1800)
BUN SERPL-MCNC: 17 MG/DL (ref 7–18)
BUN/CREAT SERPL: 17 (ref 12–20)
CALCIUM SERPL-MCNC: 8.7 MG/DL (ref 8.5–10.1)
CALCULATED R AXIS, ECG10: -49 DEGREES
CALCULATED T AXIS, ECG11: 133 DEGREES
CHLORIDE SERPL-SCNC: 103 MMOL/L (ref 100–108)
CO2 SERPL-SCNC: 27 MMOL/L (ref 21–32)
CREAT SERPL-MCNC: 0.99 MG/DL (ref 0.6–1.3)
DIAGNOSIS, 93000: NORMAL
DIFFERENTIAL METHOD BLD: ABNORMAL
EOSINOPHIL # BLD: 0.2 K/UL (ref 0–0.4)
EOSINOPHIL NFR BLD: 2 % (ref 0–5)
ERYTHROCYTE [DISTWIDTH] IN BLOOD BY AUTOMATED COUNT: 15.7 % (ref 11.6–14.5)
GLOBULIN SER CALC-MCNC: 5.6 G/DL (ref 2–4)
GLUCOSE SERPL-MCNC: 129 MG/DL (ref 74–99)
HCT VFR BLD AUTO: 33.9 % (ref 35–45)
HGB BLD-MCNC: 11 G/DL (ref 12–16)
INR PPP: 1.1 (ref 0.8–1.2)
LYMPHOCYTES # BLD: 2.6 K/UL (ref 0.9–3.6)
LYMPHOCYTES NFR BLD: 33 % (ref 21–52)
MCH RBC QN AUTO: 29.1 PG (ref 24–34)
MCHC RBC AUTO-ENTMCNC: 32.4 G/DL (ref 31–37)
MCV RBC AUTO: 89.7 FL (ref 74–97)
MONOCYTES # BLD: 0.6 K/UL (ref 0.05–1.2)
MONOCYTES NFR BLD: 7 % (ref 3–10)
NEUTS SEG # BLD: 4.5 K/UL (ref 1.8–8)
NEUTS SEG NFR BLD: 58 % (ref 40–73)
PLATELET # BLD AUTO: 294 K/UL (ref 135–420)
PMV BLD AUTO: 9.9 FL (ref 9.2–11.8)
POTASSIUM SERPL-SCNC: 4.5 MMOL/L (ref 3.5–5.5)
PROT SERPL-MCNC: 8.7 G/DL (ref 6.4–8.2)
PROTHROMBIN TIME: 13.9 SEC (ref 11.5–15.2)
Q-T INTERVAL, ECG07: 432 MS
QRS DURATION, ECG06: 168 MS
QTC CALCULATION (BEZET), ECG08: 504 MS
RBC # BLD AUTO: 3.78 M/UL (ref 4.2–5.3)
SODIUM SERPL-SCNC: 137 MMOL/L (ref 136–145)
TROPONIN I SERPL-MCNC: 0.02 NG/ML (ref 0–0.04)
TROPONIN I SERPL-MCNC: 0.02 NG/ML (ref 0–0.04)
VENTRICULAR RATE, ECG03: 82 BPM
WBC # BLD AUTO: 7.8 K/UL (ref 4.6–13.2)

## 2019-03-29 PROCEDURE — 84484 ASSAY OF TROPONIN QUANT: CPT

## 2019-03-29 PROCEDURE — 96374 THER/PROPH/DIAG INJ IV PUSH: CPT

## 2019-03-29 PROCEDURE — 80053 COMPREHEN METABOLIC PANEL: CPT

## 2019-03-29 PROCEDURE — 74011250636 HC RX REV CODE- 250/636: Performed by: EMERGENCY MEDICINE

## 2019-03-29 PROCEDURE — 94762 N-INVAS EAR/PLS OXIMTRY CONT: CPT

## 2019-03-29 PROCEDURE — 93005 ELECTROCARDIOGRAM TRACING: CPT

## 2019-03-29 PROCEDURE — 85025 COMPLETE CBC W/AUTO DIFF WBC: CPT

## 2019-03-29 PROCEDURE — 99285 EMERGENCY DEPT VISIT HI MDM: CPT

## 2019-03-29 PROCEDURE — 74011250637 HC RX REV CODE- 250/637: Performed by: EMERGENCY MEDICINE

## 2019-03-29 PROCEDURE — 85610 PROTHROMBIN TIME: CPT

## 2019-03-29 PROCEDURE — 71045 X-RAY EXAM CHEST 1 VIEW: CPT

## 2019-03-29 PROCEDURE — 83880 ASSAY OF NATRIURETIC PEPTIDE: CPT

## 2019-03-29 RX ORDER — METFORMIN HYDROCHLORIDE 500 MG/1
TABLET ORAL
COMMUNITY

## 2019-03-29 RX ORDER — ALLOPURINOL 300 MG/1
300 TABLET ORAL DAILY
COMMUNITY

## 2019-03-29 RX ORDER — ONDANSETRON 2 MG/ML
4 INJECTION INTRAMUSCULAR; INTRAVENOUS
Status: COMPLETED | OUTPATIENT
Start: 2019-03-29 | End: 2019-03-29

## 2019-03-29 RX ORDER — ERGOCALCIFEROL 1.25 MG/1
50000 CAPSULE ORAL
COMMUNITY

## 2019-03-29 RX ORDER — LEVOTHYROXINE SODIUM 50 UG/1
50 TABLET ORAL
COMMUNITY

## 2019-03-29 RX ORDER — NITROGLYCERIN 0.4 MG/1
0.4 TABLET SUBLINGUAL
COMMUNITY

## 2019-03-29 RX ORDER — CLOPIDOGREL BISULFATE 75 MG/1
75 TABLET ORAL DAILY
COMMUNITY

## 2019-03-29 RX ORDER — FUROSEMIDE 20 MG/1
20 TABLET ORAL DAILY
COMMUNITY

## 2019-03-29 RX ORDER — ATENOLOL 50 MG/1
50 TABLET ORAL DAILY
COMMUNITY

## 2019-03-29 RX ORDER — GUAIFENESIN 100 MG/5ML
162 LIQUID (ML) ORAL
Status: COMPLETED | OUTPATIENT
Start: 2019-03-29 | End: 2019-03-29

## 2019-03-29 RX ORDER — TRAMADOL HYDROCHLORIDE 50 MG/1
50 TABLET ORAL
COMMUNITY

## 2019-03-29 RX ORDER — ASPIRIN 81 MG/1
81 TABLET ORAL DAILY
COMMUNITY

## 2019-03-29 RX ORDER — BENZONATATE 100 MG/1
100 CAPSULE ORAL
COMMUNITY

## 2019-03-29 RX ORDER — ISOSORBIDE MONONITRATE 60 MG/1
60 TABLET, EXTENDED RELEASE ORAL
Status: COMPLETED | OUTPATIENT
Start: 2019-03-29 | End: 2019-03-29

## 2019-03-29 RX ORDER — ATORVASTATIN CALCIUM 40 MG/1
40 TABLET, FILM COATED ORAL DAILY
COMMUNITY

## 2019-03-29 RX ORDER — ISOSORBIDE MONONITRATE 60 MG/1
60 TABLET, EXTENDED RELEASE ORAL
COMMUNITY

## 2019-03-29 RX ADMIN — ISOSORBIDE MONONITRATE 60 MG: 60 TABLET, EXTENDED RELEASE ORAL at 05:55

## 2019-03-29 RX ADMIN — ASPIRIN 81 MG 162 MG: 81 TABLET ORAL at 04:38

## 2019-03-29 RX ADMIN — ONDANSETRON 4 MG: 2 INJECTION INTRAMUSCULAR; INTRAVENOUS at 04:37

## 2019-03-29 RX ADMIN — NITROGLYCERIN 1 INCH: 20 OINTMENT TOPICAL at 04:38

## 2019-03-29 NOTE — ED PROVIDER NOTES
Mahesh Curry is a 70 y.o. Female with history of hypertension, coronary disease with stents, non-insulin-dependent diabetes, thyroid disease with complaints of onset of substernal chest pain pressure like this started earlier in the afternoon. This is progressed throughout the evening for which she took 1 nitroglycerin prior to arrival with significant relief. She did vomit once. She has no pain at this time. Patient denies any recent exertional pain or shortness of breath. She has no increased edema. She has been compliant with her Plavix and aspirin. All her cardiac work was done up in [place]. She is currently followed by [facility] locally and is not seen a cardiologist as of yet. The history is provided by the patient. Past Medical History:  
Diagnosis Date  Diabetes ([address] type II  
 Hypertension  Hypothyroidism  Hypothyroidism Past Surgical History:  
Procedure Laterality Date  CARDIAC SURG PROCEDURE UNLIST  03/14/2007 Distal LAD Stent History reviewed. No pertinent family history. Social History Socioeconomic History  Marital status: SINGLE Spouse name: Not on file  Number of children: Not on file  Years of education: Not on file  Highest education level: Not on file Occupational History  Not on file Social Needs  Financial resource strain: Not on file  Food insecurity:  
  Worry: Not on file Inability: Not on file  Transportation needs:  
  Medical: Not on file Non-medical: Not on file Tobacco Use  Smoking status: Not on file Substance and Sexual Activity  Alcohol use: Not on file  Drug use: Not on file  Sexual activity: Not on file Lifestyle  Physical activity:  
  Days per week: Not on file Minutes per session: Not on file  Stress: Not on file Relationships  Social connections:  
  Talks on phone: Not on file Gets together: Not on file Attends Latter day service: Not on file Active member of club or organization: Not on file Attends meetings of clubs or organizations: Not on file Relationship status: Not on file  Intimate partner violence:  
  Fear of current or ex partner: Not on file Emotionally abused: Not on file Physically abused: Not on file Forced sexual activity: Not on file Other Topics Concern  Not on file Social History Narrative  Not on file ALLERGIES: Patient has no known allergies. Review of Systems Constitutional: Negative for fever. HENT: Negative for sore throat and trouble swallowing. Eyes: Negative for visual disturbance. Respiratory: Positive for shortness of breath. Negative for cough. Cardiovascular: Positive for chest pain. Gastrointestinal: Negative for abdominal pain. Endocrine: Negative for polyuria. Genitourinary: Negative for difficulty urinating. Musculoskeletal: Negative for gait problem. Skin: Negative for rash. Allergic/Immunologic: Negative for immunocompromised state. Neurological: Negative for syncope. Hematological: Bruises/bleeds easily. Psychiatric/Behavioral: Positive for sleep disturbance. Vitals:  
 03/29/19 0339 03/29/19 0500 BP: (!) 231/93 (!) 196/96 Pulse: 83 82 Resp: 20 21 Temp: 97.6 °F (36.4 °C) SpO2: 100% 99% Weight: 136.1 kg (300 lb) Height: 5' 1\" (1.549 m) Physical Exam  
Constitutional: She is oriented to person, place, and time. She appears well-developed and well-nourished. No distress. HENT:  
Head: Normocephalic and atraumatic. Right Ear: External ear normal.  
Left Ear: External ear normal.  
Nose: Nose normal.  
Mouth/Throat: Uvula is midline, oropharynx is clear and moist and mucous membranes are normal.  
Eyes: Conjunctivae are normal. No scleral icterus. Neck: Neck supple. Cardiovascular: Normal rate, regular rhythm, normal heart sounds and intact distal pulses. Pulmonary/Chest: Effort normal and breath sounds normal. She exhibits no tenderness. Abdominal: Soft. There is no tenderness. Musculoskeletal: She exhibits no edema. Neurological: She is alert and oriented to person, place, and time. Gait normal.  
Skin: Skin is warm and dry. Capillary refill takes less than 2 seconds. She is not diaphoretic. Psychiatric: Her behavior is normal.  
Nursing note and vitals reviewed. MDM Procedures Vitals: 
Patient Vitals for the past 12 hrs: 
 Temp Pulse Resp BP SpO2  
03/29/19 0500  82 21 (!) 196/96 99 % 03/29/19 0339 97.6 °F (36.4 °C) 83 20 (!) 231/93 100 % Medications ordered:  
Medications  
aspirin chewable tablet 162 mg (162 mg Oral Given 3/29/19 0128)  
nitroglycerin (NITROBID) 2 % ointment 1 Inch (1 Inch Topical Given 3/29/19 0358) ondansetron [facility]) injection 4 mg (4 mg IntraVENous Given 3/29/19 2707) Lab findings: 
Recent Results (from the past 12 hour(s)) EKG, 12 LEAD, INITIAL Collection Time: 03/29/19  3:36 AM  
Result Value Ref Range Ventricular Rate 82 BPM  
 Atrial Rate 104 BPM  
 QRS Duration 168 ms Q-T Interval 432 ms QTC Calculation (Bezet) 504 ms Calculated R Axis -49 degrees Calculated T Axis 133 degrees Diagnosis Atrial fibrillation Left axis deviation Left bundle branch block Abnormal ECG No previous ECGs available CBC WITH AUTOMATED DIFF Collection Time: 03/29/19  3:50 AM  
Result Value Ref Range WBC 7.8 4.6 - 13.2 K/uL  
 RBC 3.78 (L) 4.20 - 5.30 M/uL  
 HGB 11.0 (L) 12.0 - 16.0 g/dL HCT 33.9 (L) 35.0 - 45.0 % MCV 89.7 74.0 - 97.0 FL  
 MCH 29.1 24.0 - 34.0 PG  
 MCHC 32.4 31.0 - 37.0 g/dL  
 RDW 15.7 (H) 11.6 - 14.5 % PLATELET 109 552 - 540 K/uL MPV 9.9 9.2 - 11.8 FL  
 NEUTROPHILS 58 40 - 73 % LYMPHOCYTES 33 21 - 52 % MONOCYTES 7 3 - 10 % EOSINOPHILS 2 0 - 5 % BASOPHILS 0 0 - 2 %  
 ABS. NEUTROPHILS 4.5 1.8 - 8.0 K/UL ABS. LYMPHOCYTES 2.6 0.9 - 3.6 K/UL  
 ABS. MONOCYTES 0.6 0.05 - 1.2 K/UL  
 ABS. EOSINOPHILS 0.2 0.0 - 0.4 K/UL  
 ABS. BASOPHILS 0.0 0.0 - 0.1 K/UL  
 DF AUTOMATED METABOLIC PANEL, COMPREHENSIVE Collection Time: 03/29/19  3:50 AM  
Result Value Ref Range Sodium 137 136 - 145 mmol/L Potassium 4.5 3.5 - 5.5 mmol/L Chloride 103 100 - 108 mmol/L  
 CO2 27 21 - 32 mmol/L Anion gap 7 3.0 - 18 mmol/L Glucose 129 (H) 74 - 99 mg/dL BUN 17 7.0 - 18 MG/DL Creatinine 0.99 0.6 - 1.3 MG/DL  
 BUN/Creatinine ratio 17 12 - 20 GFR est AA >60 >60 ml/min/1.73m2 GFR est non-AA 55 (L) >60 ml/min/1.73m2 Calcium 8.7 8.5 - 10.1 MG/DL Bilirubin, total 0.5 0.2 - 1.0 MG/DL  
 ALT (SGPT) 50 13 - 56 U/L  
 AST (SGOT) 79 (H) 15 - 37 U/L Alk. phosphatase 268 (H) 45 - 117 U/L Protein, total 8.7 (H) 6.4 - 8.2 g/dL Albumin 3.1 (L) 3.4 - 5.0 g/dL Globulin 5.6 (H) 2.0 - 4.0 g/dL A-G Ratio 0.6 (L) 0.8 - 1.7 NT-PRO BNP Collection Time: 03/29/19  3:50 AM  
Result Value Ref Range NT pro-BNP 1,565 0 - 1,800 PG/ML  
TROPONIN I Collection Time: 03/29/19  3:50 AM  
Result Value Ref Range Troponin-I, QT 0.02 0.0 - 0.045 NG/ML  
PROTHROMBIN TIME + INR Collection Time: 03/29/19  4:41 AM  
Result Value Ref Range Prothrombin time 13.9 11.5 - 15.2 sec INR 1.1 0.8 - 1.2 EKG interpretation by ED Physician: 
afib with lbbb. Nl rate. No acute st tw abnl Rate 82, qrs 168, qtc 504 No previous Cardiac monitor: nl rate, irreg rhythm no ectopy Pulse ox: 99% ra X-Ray, CT or other radiology findings or impressions: 
XR CHEST PORT    (Results Pending)  
nap per my interp Progress notes, Consult notes or additional Procedure notes:  
Patient has no further pain. We will repeat her troponin EKG is a 3-hour veronica. Return noted Dr. Lalita Welsh at approximate 6 AM to follow-up and if no acute changes and troponin has not going up and patient can be discharged home Reevaluation of patient:  
stable Disposition: 
Diagnosis: 1. Acute chest pain 2. Coronary artery disease involving native coronary artery of native heart, angina presence unspecified Disposition: pending Follow-up Information Follow up With Specialties Details Why Contact Info Izabella Alas MD Cardiology, Internal Medicine Schedule an appointment as soon as possible for a visit with this cardiologist as soon as possible [address] Cardiovascular Specialists [facility] 
[phone] 
  
 call your doctor at [facility] this morning to inform of ER visit and to get reseen as soon as possible [facility] EMERGENCY DEPT Emergency Medicine  If symptoms worsen [id number] [address] 
[phone] Patient's Medications Start Taking No medications on file Continue Taking ALLOPURINOL (ZYLOPRIM) 300 MG TABLET    Take 300 mg by mouth daily. ASPIRIN DELAYED-RELEASE 81 MG TABLET    Take 81 mg by mouth daily. ATENOLOL (TENORMIN) 50 MG TABLET    Take 50 mg by mouth daily. ATORVASTATIN (LIPITOR) 40 MG TABLET    Take 40 mg by mouth daily. BENZONATATE (TESSALON) 100 MG CAPSULE    Take 100 mg by mouth two (2) times daily as needed for Cough. CLOPIDOGREL (PLAVIX) 75 MG TAB    Take 75 mg by mouth daily. ERGOCALCIFEROL (VITAMIN D2) 50,000 UNIT CAPSULE    Take 50,000 Units by mouth every seven (7) days. FUROSEMIDE (LASIX) 20 MG TABLET    Take 20 mg by mouth daily. ISOSORBIDE MONONITRATE ER (IMDUR) 60 MG CR TABLET    Take 60 mg by mouth every morning. LEVOTHYROXINE (SYNTHROID) 50 MCG TABLET    Take 50 mcg by mouth Daily (before breakfast). LORATADINE 10 MG CAP    Take 10 mg by mouth. METFORMIN (GLUCOPHAGE) 500 MG TABLET    Take  by mouth daily (with breakfast). NITROGLYCERIN (NITROSTAT) 0.4 MG SL TABLET    0.4 mg by SubLINGual route every five (5) minutes as needed for Chest Pain. Up to 3 doses. TRAMADOL (ULTRAM) 50 MG TABLET    Take 50 mg by mouth every six (6) hours as needed for Pain. These Medications have changed No medications on file Stop Taking No medications on file

## 2019-03-29 NOTE — DISCHARGE INSTRUCTIONS

## 2019-03-29 NOTE — ED NOTES
Patient signed out to me at 6 AM.  Patient presented to the emergency department with chest pain last night no chest pain in the emergency department. Patient has history of coronary artery disease, LAD stent, atrial fibrillation, left bundle branch block EKG today at 7:15 AM shows 65 heart rate with atrial fibrillation left bundle branch block, leftward axis, QTC of 486 ms This EKG is compared with 3:36 AM EKG. The heart rate on that EKG was 82, QTC was wide, left axis was present all the seen on that EKG as well. I reviewed patient's prior lab and I am awaiting patient's troponin from this morning at 7 AM. Recent Results (from the past 24 hour(s)) EKG, 12 LEAD, INITIAL Collection Time: 03/29/19  3:36 AM  
Result Value Ref Range Ventricular Rate 82 BPM  
 Atrial Rate 104 BPM  
 QRS Duration 168 ms Q-T Interval 432 ms QTC Calculation (Bezet) 504 ms Calculated R Axis -49 degrees Calculated T Axis 133 degrees Diagnosis Atrial fibrillation Left axis deviation Left bundle branch block Abnormal ECG No previous ECGs available CBC WITH AUTOMATED DIFF Collection Time: 03/29/19  3:50 AM  
Result Value Ref Range WBC 7.8 4.6 - 13.2 K/uL  
 RBC 3.78 (L) 4.20 - 5.30 M/uL  
 HGB 11.0 (L) 12.0 - 16.0 g/dL HCT 33.9 (L) 35.0 - 45.0 % MCV 89.7 74.0 - 97.0 FL  
 MCH 29.1 24.0 - 34.0 PG  
 MCHC 32.4 31.0 - 37.0 g/dL  
 RDW 15.7 (H) 11.6 - 14.5 % PLATELET 662 201 - 386 K/uL MPV 9.9 9.2 - 11.8 FL  
 NEUTROPHILS 58 40 - 73 % LYMPHOCYTES 33 21 - 52 % MONOCYTES 7 3 - 10 % EOSINOPHILS 2 0 - 5 % BASOPHILS 0 0 - 2 %  
 ABS. NEUTROPHILS 4.5 1.8 - 8.0 K/UL  
 ABS. LYMPHOCYTES 2.6 0.9 - 3.6 K/UL  
 ABS. MONOCYTES 0.6 0.05 - 1.2 K/UL  
 ABS. EOSINOPHILS 0.2 0.0 - 0.4 K/UL  
 ABS. BASOPHILS 0.0 0.0 - 0.1 K/UL  
 DF AUTOMATED METABOLIC PANEL, COMPREHENSIVE Collection Time: 03/29/19  3:50 AM  
Result Value Ref Range  Sodium 137 136 - 145 mmol/L  
 Potassium 4.5 3.5 - 5.5 mmol/L Chloride 103 100 - 108 mmol/L  
 CO2 27 21 - 32 mmol/L Anion gap 7 3.0 - 18 mmol/L Glucose 129 (H) 74 - 99 mg/dL BUN 17 7.0 - 18 MG/DL Creatinine 0.99 0.6 - 1.3 MG/DL  
 BUN/Creatinine ratio 17 12 - 20 GFR est AA >60 >60 ml/min/1.73m2 GFR est non-AA 55 (L) >60 ml/min/1.73m2 Calcium 8.7 8.5 - 10.1 MG/DL Bilirubin, total 0.5 0.2 - 1.0 MG/DL  
 ALT (SGPT) 50 13 - 56 U/L  
 AST (SGOT) 79 (H) 15 - 37 U/L Alk. phosphatase 268 (H) 45 - 117 U/L Protein, total 8.7 (H) 6.4 - 8.2 g/dL Albumin 3.1 (L) 3.4 - 5.0 g/dL Globulin 5.6 (H) 2.0 - 4.0 g/dL A-G Ratio 0.6 (L) 0.8 - 1.7 NT-PRO BNP Collection Time: 03/29/19  3:50 AM  
Result Value Ref Range NT pro-BNP 1,565 0 - 1,800 PG/ML  
TROPONIN I Collection Time: 03/29/19  3:50 AM  
Result Value Ref Range Troponin-I, QT 0.02 0.0 - 0.045 NG/ML  
PROTHROMBIN TIME + INR Collection Time: 03/29/19  4:41 AM  
Result Value Ref Range Prothrombin time 13.9 11.5 - 15.2 sec INR 1.1 0.8 - 1.2 EKG, 12 LEAD, SUBSEQUENT Collection Time: 03/29/19  7:15 AM  
Result Value Ref Range Ventricular Rate 65 BPM  
 Atrial Rate 312 BPM  
 QRS Duration 176 ms  
 Q-T Interval 468 ms QTC Calculation (Bezet) 486 ms Calculated R Axis -48 degrees Calculated T Axis 139 degrees Diagnosis Atrial fibrillation Left bundle branch block Abnormal ECG When compared with ECG of 29-MAR-2019 03:36, No significant change was found ICD-10-CM ICD-9-CM 1. Acute chest pain R07.9 786.50 2. Coronary artery disease involving native coronary artery of native heart, angina presence unspecified I25.10 414.01 Disposition: Home Patient reevaluated at 9:50 AM 
Patient no distress no chest pain or shortness of breath no abdominal pain lab and EKG discussed patient agrees with the plan and states this is appropriate for her and she is very comfortable with outpatient follow-up.

## 2019-03-29 NOTE — ED NOTES
Verbal shift change report given to [phone] (oncoming nurse) by [facility] (offgoing nurse). Report included the following information SBAR.

## 2019-03-29 NOTE — ED NOTES
Patient stated understanding of discharge instructions. Patient was ambulatory upon discharge. Patient received no prescriptions. Patient armband removed and shredded Patient assisted to a wheel chair, patient brought to the waiting room to call her daughter for a ride home

## 2019-03-30 LAB
ATRIAL RATE: 312 BPM
CALCULATED R AXIS, ECG10: -48 DEGREES
CALCULATED T AXIS, ECG11: 139 DEGREES
DIAGNOSIS, 93000: NORMAL
Q-T INTERVAL, ECG07: 468 MS
QRS DURATION, ECG06: 176 MS
QTC CALCULATION (BEZET), ECG08: 486 MS
VENTRICULAR RATE, ECG03: 65 BPM

## 2021-11-19 NOTE — PATIENT PROFILE ADULT. - NS PRO PT REFERRAL QUES 2 YN
Patient states she was in [place]estred[place] and was given a cortisone shot. Once she was about to leave someone came into take measurments and she was given some paper work with someone else's name. She believes it was a mistake.  Please advise no